# Patient Record
(demographics unavailable — no encounter records)

---

## 2017-04-30 NOTE — EDM.PDOC
ED HPI GENERAL MEDICAL PROBLEM





- General


Chief Complaint: Respiratory Problem


Stated Complaint: WHEEZING/COLD/CONGESTION


Time Seen by Provider: 04/30/17 18:53





- History of Present Illness


INITIAL COMMENTS - FREE TEXT/NARRATIVE: 


PEDS HISTORY AND PHYSICAL:





History of present illness:


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 

ICU x1 month and not on a ventilator presents with a concern of cough 

congestion and fever x2 days per mom he is up-to-date on his immunizations he 

is consolable he has been taking approximately 4 ounces every 3-4 hours but no 

vomiting or diarrhea.





Review of systems: 


As per history of present illness and below otherwise all systems reviewed and 

negative.





Past medical history: 


As per history of present illness and as reviewed below otherwise 

noncontributory.





Surgical history: 


As per history of present illness and as reviewed below otherwise 

noncontributory.





Social history: 


No reported history of drug or alcohol abuse.





Family history: 


As per history of present illness and as reviewed below otherwise 

noncontributory.





Physical exam:


HEENT: Atraumatic, normocephalic, pupils reactive, negative for conjunctival 

pallor or scleral icterus, mucous membranes moist, throat clear, neck supple, 

nontender, trachea midline.  TMs normal bilaterally, no cervical adenopathy or 

nuchal rigidity.  


Lungs: Clear to auscultation, breath sounds equal bilaterally, chest nontender.


Heart: S1S2, regular rate and rhythm, no overt murmurs


Abdomen: Soft, nondistended, nontender. Negative for masses or 

hepatosplenomegaly. Normal abdominal bowel sounds.  


Pelvis: Stable nontender.


Genitourinary: Deferred.


Rectal: Deferred.


Extremities: Atraumatic, full range of motion without defects or deficits. 

Neurovascular unremarkable.


Neuro: Awake, alert, and age appropriate non focal non toxic exam


Skin:  Normal turgor, no overt rash or lesions


Diagnostics:


RSV and influenza screen chest x-ray





Therapeutics:


None





Impression: 


#1 fever #2 viral syndrome #2 history of premature


  








Definitive disposition and diagnosis as appropriate pending reevaluation and 

review of above.














- Related Data


 Allergies











Allergy/AdvReac Type Severity Reaction Status Date / Time


 


No Known Allergies Allergy   Verified 04/30/17 18:10











Home Meds: 


 Home Meds





Ranitidine [Zantac] 1 ml PO BID 04/30/17 [History]











Past Medical History





- Past Health History


Medical/Surgical History: Denies Medical/Surgical History


HEENT History: Reports: None


Cardiovascular History: Reports: None


Respiratory History: Reports: None


Gastrointestinal History: Reports: Other (see below)


Other Gastrointestinal History: reflux


Genitourinary History: Reports: None


Musculoskeletal History: Reports: None


Neurological History: Reports: None


Psychiatric History: Reports: None


Endocrine/Metabolic History: Reports: None


Hematologic History: Reports: None


Immunologic History: Reports: None


Oncologic (Cancer) History: Reports: None


Dermatologic History: Reports: None





- Past Surgical History


Head Surgeries/Procedures: Reports: None





Social & Family History





- Family History


Family Medical History: Noncontributory





- Tobacco Use


Second Hand Smoke Exposure: No





ED ROS GENERAL





- Review of Systems


Review Of Systems: ROS reveals no pertinent complaints other than HPI.





ED EXAM, GENERAL





- Physical Exam


Exam: See Below (See dictation)





Course





- Vital Signs


Text/Narrative:: 


RSV and flu screen were negative chest x-ray was negative the child did have 

some wheezing noted during his ED course and was given an albuterol med mild 

improvement that there is no significant retractions I did observe the child 

subsequent I discussed with mom admitted for observation versus outpatient as 

discussed and she is more comfortable palpation and see  in the morning she 

is to return as needed discussed and she agrees to home nebulizer as directed.





Last Recorded V/S: 


 Last Vital Signs











Temp  39.1 C H  04/30/17 20:17


 


Pulse  180 H  04/30/17 21:31


 


Resp  30   04/30/17 21:31


 


BP      


 


Pulse Ox  98   04/30/17 21:31














- Orders/Labs/Meds


Orders: 


 Active Orders 24 hr











 Category Date Time Status


 


 RT Aerosol Therapy [RC] ASDIRECTED Care  04/30/17 21:10 Active


 


 Chest 1V Frontal [CR] Stat Exams  04/30/17 19:12 Taken











Meds: 


Medications














Discontinued Medications














Generic Name Dose Route Start Last Admin





  Trade Name Freq  PRN Reason Stop Dose Admin


 


Albuterol/Ipratropium  3 ml  04/30/17 21:10  





  Duoneb 3.0-0.5 Mg/3 Ml  NEB  04/30/17 21:11  





  ONETIME ONE   














Departure





- Departure


Time of Disposition: 21:25


Disposition: Home, Self-Care 01


Condition: good


Clinical Impression: 


 Viral syndrome





Referrals: 


Hernando Gallegos MD [Primary Care Provider] - 


Forms:  ED Department Discharge


Additional Instructions: 


The following information is given to patients seen in the emergency department 

who are being discharged to home. This information is to outline your options 

for follow-up care. We provide all patients seen in our emergency department 

with a follow-up referral.





The need for follow-up, as well as the timing and circumstances, are variable 

depending upon the specifics of your emergency department visit.





If you don't have a primary care physician on staff, we will provide you with a 

referral. We always advise you to contact your personal physician following an 

emergency department visit to inform them of the circumstance of the visit and 

for follow-up with them and/or the need for any referrals to a consulting 

specialist.





The emergency department will also refer you to a specialist when appropriate. 

This referral assures that you have the opportunity for followup care with a 

specialist. All of these measure are taken in an effort to provide you with 

optimal care, which includes your followup.





Under all circumstances we always encourage you to contact your private 

physician who remains a resource for coordinating  your care. When calling for 

followup care, please make the office aware that this follow-up is from your 

recent emergency room visit. If for any reason you are refused follow-up, 

please contact the [facility] emergency department at [phone] 

and asked to speak to the emergency department charge nurse.



































Followup pediatrician in 24 hours nebulizer as directed Motrin, as directed 

return as needed as discussed





- My Orders


Last 24 Hours: 


My Active Orders





04/30/17 19:12


Chest 1V Frontal [CR] Stat 





04/30/17 21:10


RT Aerosol Therapy [RC] ASDIRECTED 














- Assessment/Plan


Last 24 Hours: 


My Active Orders





04/30/17 19:12


Chest 1V Frontal [CR] Stat 





04/30/17 21:10


RT Aerosol Therapy [RC] ASDIRECTED

## 2017-05-01 NOTE — CR
EXAM DATE: 17



PATIENT'S AGE: 04M 29D



Patient: YUSUF PARADA



Facility: [place] [place]

Patient ID: [id number]

Site Patient ID: [id number].

Site Accession #: [id number].

: 2016

Study: XRay Chest [id number]/30/2017 7:23:02 PM

Ordering Physician: Lazaro Shah



Final Report: 

INDICATION: fever



Single AP view 



Findings: The lungs are clear. Pulmonary vascularity, mediastinum and cardiac 
silhouette are within normal limits. No effusions and no pneumothorax. Osseous 
structures appear unremarkable.



Impression: No evidence of acute cardiopulmonary disease.



Dictated by: Paco Anaya MD @ 2017 19:42:25

(Electronic Signature)



Report Signed by Proxy.
[facility] HospitalJESUS

## 2017-05-08 NOTE — CR
EXAMINATION: Single contrast upper GI and small bowel follow-through

 

HISTORY: Umbilical hernia

 

COMPARISON: None

 

TECHNIQUE: A standard single contrast upper GI and small bowel follow-through were performed. Overal
l approximately 3.5 mGy of fluoroscopy used.

 

FINDINGS: The patient swallowed barium without difficulty.  Esophageal motility is normal. No ulcera
tion or filling defect. The stomach is normally distensible. No filling defect or ulceration. The du
odenum takes a retroperitoneal course without evidence of malrotation.

 

The small bowel is normal in caliber without evidence of obstruction. The small bowel fold pattern i
s normal. There is a small umbilical hernia containing small bowel.  This has somewhat of a narrow n
duncan without complete obstruction. The terminal ileum was not well evaluated individually. Small madhav
l transit time is approximately 2 1/2 hours.

 

IMPRESSION: 

1. Umbilical hernia containing small bowel with a relatively small neck. No evidence of complete obs
truction.